# Patient Record
(demographics unavailable — no encounter records)

---

## 2024-12-20 NOTE — HISTORY OF PRESENT ILLNESS
[FreeTextEntry1] : Ms. REYES is a 42 year female presenting for evaluation of CAD and HF.    Notable PMHx: - HFrEF 2/2 ICM - CAD s/p NSTEMI - Obesity s/p gastric sleeve  - Prediabetes  - LUIS - no family history of MI or SCD   HPI: Here for follow up after recent admission to [facility] for chest pain that was right sided and occuring at rest and later after meals. Described as pressure like. She had a runs of NSVT and then later PMVT arrest the day of admission requiring two rounds of CPR and 1 round of epi. She was taken emergently to the cath lab where she was found to have an acute OM plaque rupture and distal embolization which was stented with a good angioraphic result. She was started on GDMT with losartan and toprol, statin, and DAPT with brilinta. She has been feeling okay since discharge struggling with when a pain is significant or not. She also has been having some light-headedness and heavy menses.    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 Lateral TWI - 12/8/24: Sinus tach. inferolateral ST elevations. Echo - 12/10/24: LVEF 28%. - 12/8/2024 TTE: LVEF 20%. The entire apex, mid and apical anterior wall, mid and apical anterior septum, mid and apical inferior septum, mid and apical inferior wall, mid inferolateral segment, and mid anterolateral segment are akinetic. The basal anteroseptal segment, basal inferoseptal segment, basal inferolateral segment, basal anterolateral segment, basal anterior segment, and basal inferior segment are hypokinetic. Mil AI, mild to mod TR, PASP 48 Stress - 3/2016 EKG stress: normal. 10 mets, exercised 8.5 minutes.  Cardiac MRI - 12/11/24: LVEF 45%, wall motion abnormality of mid to apical and mid inferolateral/inferior walls and apical lateral and inferior wall with mild myocardial edema and subendocardial delayed enhancement. Normal RV. LGE < 50% thickness of wall.  Cath - 12/8/24: 1. Severe distal OM1 stenosis s/p successful balloon angioplasty and AMERICA x1. This is the culprit for the patient's presentation. IVUS shows atherosclerotic lesion. Patient had resolution of chest pain after PCI. 2. Severe LV dysfunction that is out of proportion to CAD. LVEDP was 31mmHg at the start of the case and patient was complaining of acute shortness of breath. She was placed on Bipap and given 20mg IV Lasix with a good urine output. Following PCI, a RHC was performed: RA 5, RV 35/7, PA 26/15/19, PCWP 16/17/13. Brennan CO 4.81, CI 2.59. Other -

## 2024-12-20 NOTE — HISTORY OF PRESENT ILLNESS
[FreeTextEntry1] : Ms. REYES is a 42 year female presenting for evaluation of CAD and HF.    Notable PMHx: - HFrEF 2/2 ICM - CAD s/p NSTEMI - Obesity s/p gastric sleeve  - Prediabetes  - LUIS - no family history of MI or SCD   HPI: Here for follow up after recent admission to [facility] for chest pain that was right sided and occuring at rest and later after meals. Described as pressure like. She had a runs of NSVT and then later PMVT arrest the day of admission requiring two rounds of CPR and 1 round of epi. She was taken emergently to the cath lab where she was found to have an acute OM plaque rupture and distal embolization which was stented with a good angioraphic result. She was started on GDMT with losartan and toprol, statin, and DAPT with brilinta. She has been feeling okay since discharge struggling with when a pain is significant or not. She also has been having some light-headedness and heavy menses.    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 Lateral TWI - 12/8/24: Sinus tach. inferolateral ST elevations. Echo - 12/10/24: LVEF 28%. - 12/8/2024 TTE: LVEF 20%. The entire apex, mid and apical anterior wall, mid and apical anterior septum, mid and apical inferior septum, mid and apical inferior wall, mid inferolateral segment, and mid anterolateral segment are akinetic. The basal anteroseptal segment, basal inferoseptal segment, basal inferolateral segment, basal anterolateral segment, basal anterior segment, and basal inferior segment are hypokinetic. Mil AI, mild to mod TR, PASP 48 Stress - 3/2016 EKG stress: normal. 10 mets, exercised 8.5 minutes.  Cardiac MRI - 12/11/24: LVEF 45%, wall motion abnormality of mid to apical and mid inferolateral/inferior walls and apical lateral and inferior wall with mild myocardial edema and subendocardial delayed enhancement. Normal RV. LGE < 50% thickness of wall.  Cath - 12/8/24: 1. Severe distal OM1 stenosis s/p successful balloon angioplasty and AMERICA x1. This is the culprit for the patient's presentation. IVUS shows atherosclerotic lesion. Patient had resolution of chest pain after PCI. 2. Severe LV dysfunction that is out of proportion to CAD. LVEDP was 31mmHg at the start of the case and patient was complaining of acute shortness of breath. She was placed on Bipap and given 20mg IV Lasix with a good urine output. Following PCI, a RHC was performed: RA 5, RV 35/7, PA 26/15/19, PCWP 16/17/13. Brennan CO 4.81, CI 2.59. Other -

## 2024-12-20 NOTE — HISTORY OF PRESENT ILLNESS
[FreeTextEntry1] : Ms. REYES is a 42 year female presenting for evaluation of CAD and HF.    Notable PMHx: - HFrEF 2/2 ICM - CAD s/p NSTEMI - Obesity s/p gastric sleeve  - Prediabetes  - LUIS - no family history of MI or SCD   HPI: Here for follow up after recent admission to [facility] for chest pain that was right sided and occuring at rest and later after meals. Described as pressure like. She had a runs of NSVT and then later PMVT arrest the day of admission requiring two rounds of CPR and 1 round of epi. She was taken emergently to the cath lab where she was found to have an acute OM plaque rupture and distal embolization which was stented with a good angioraphic result. She was started on GDMT with losartan and toprol, statin, and DAPT with brilinta. She has been feeling okay since discharge struggling with when a pain is significant or not. She also has been having some light-headedness and heavy menses.    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 Lateral TWI - 12/8/24: Sinus tach. inferolateral ST elevations. Echo - 12/10/24: LVEF 28%. - 12/8/2024 TTE: LVEF 20%. The entire apex, mid and apical anterior wall, mid and apical anterior septum, mid and apical inferior septum, mid and apical inferior wall, mid inferolateral segment, and mid anterolateral segment are akinetic. The basal anteroseptal segment, basal inferoseptal segment, basal inferolateral segment, basal anterolateral segment, basal anterior segment, and basal inferior segment are hypokinetic. Mil AI, mild to mod TR, PASP 48 Stress - 3/2016 EKG stress: normal. 10 mets, exercised 8.5 minutes.  Cardiac MRI - 12/11/24: LVEF 45%, wall motion abnormality of mid to apical and mid inferolateral/inferior walls and apical lateral and inferior wall with mild myocardial edema and subendocardial delayed enhancement. Normal RV. LGE < 50% thickness of wall.  Cath - 12/8/24: 1. Severe distal OM1 stenosis s/p successful balloon angioplasty and AMERICA x1. This is the culprit for the patient's presentation. IVUS shows atherosclerotic lesion. Patient had resolution of chest pain after PCI. 2. Severe LV dysfunction that is out of proportion to CAD. LVEDP was 31mmHg at the start of the case and patient was complaining of acute shortness of breath. She was placed on Bipap and given 20mg IV Lasix with a good urine output. Following PCI, a RHC was performed: RA 5, RV 35/7, PA 26/15/19, PCWP 16/17/13. Brennan CO 4.81, CI 2.59. Other -

## 2024-12-20 NOTE — DISCUSSION/SUMMARY
[EKG obtained to assist in diagnosis and management of assessed problem(s)] : EKG obtained to assist in diagnosis and management of assessed problem(s) [FreeTextEntry1] : Here for follow up after recent admission for ACS complicated by TdP arrest requiring CPR and epi and ICM with reduced LVEF. Her CMR showed improving LVEF compared to the prior echos making myocardial stunning much more likely. Today she has borderline low BP and light-headedness. Will check CBC to ensure no significant anemia given her heavy menses while on DAPT and encouraged her to switch losartan and toprol to nighttime dosing. Will avoid any further GDMT titration for now.   # Light-headedness # Heavy Menses - check CBC and iron studies  - she started iron supplementation per her PCP  # HFrEF 2/2 ICM - c/w losartan 25mg qd - c/w toprol 50mg qd  - hold off on cardiac rehab till light-headedness/BP issues are resolved    # CAD s/p PCI - c/w DAPT with brilinta for at least 1 year  - c/w atorvastatin 80mg qd - BB as above  RTC in 2 weeks for telehealth visit or sooner PRN

## 2024-12-20 NOTE — END OF VISIT
[Time Spent: ___ minutes] : I have spent [unfilled] minutes of time on the encounter which excludes teaching and separately reported services. throat

## 2024-12-22 NOTE — HISTORY OF PRESENT ILLNESS
[Post-hospitalization from ___ Hospital] : Post-hospitalization from [unfilled] Hospital [Admitted on: ___] : The patient was admitted on [unfilled] [Discharged on ___] : discharged on [unfilled] [Patient Contacted By: ____] : and contacted by [unfilled] [FreeTextEntry2] : 42-year-old female with history of obesity, s/p gastric sleeve on GLP, prediabetes and LUIS who presented to [facility] hospital on December 17, 2024 with chest pain on the right side that started around 2 PM and lasted for 20 minutes while at rest and resolved spontaneously.  Chest pain occurred again 2 hours later around 4 PM after eating a meal but then persisted and prompted ED presentation.  Patient described the chest pain as pressure and did not feel similar to her GERD symptoms, nor is it related to position.  Patient denies taking any new medication. Patient taking PPI and tirzepatide. Stress test and TTE from 2016 were normal. As per EMS note, no improvement with fentanyl or nitro sublingual in ER. Hospital course: Patient was admitted with NSTEMI, presented with midsternal chest pain and concern for ACS.  Started on heparin GTT, atorvastatin and trended cardiac enzymes that peaked at 614.  Patient was CODE BLUE, required active CPR achieving ROSC after 2 rounds and one-time epinephrine then taken to urgent cardiac catheterization and CICU admission.  On December 8 s/p 1 AMERICA to OM.  LHC: % RHC: Elevated filling pressures. TTE showed severely decreased LVEF 20 to 25%.  Patient was transition to DAPT, metoprolol and restarted on losartan. Repeat TTE showed continued decrease LVEF, MRI on December 11 showed ischemic cardiomyopathy and EF 45% improved compared to TTE. She was advised to follow-up with as an outpatient with cardiology. MRI also incidentally showed liver lesions and will require a dedicated MRI of the abdomen which can be performed as an outpatient.  Patient is currently stable to discharge to home. Active or pending issues requiring follow-up:  PCP follow-cardiology follow-up with Dr. Nam or Dr. Tha Bellamy [phone] Dr. Tha Bellamy for repeat TTE to assess LV function up Last travel history was 1 month ago to [place] in [place] on a cruise to [facility]. Family history notable for rheumatic heart disease in mother who had rheumatic fever and PPM.  There is no history of MI in the family.

## 2024-12-22 NOTE — HISTORY OF PRESENT ILLNESS
[Post-hospitalization from ___ Hospital] : Post-hospitalization from [unfilled] Hospital [Admitted on: ___] : The patient was admitted on [unfilled] [Discharged on ___] : discharged on [unfilled] [Patient Contacted By: ____] : and contacted by [unfilled] [FreeTextEntry2] : 42-year-old female with history of obesity, s/p gastric sleeve on GLP, prediabetes and LUIS who presented to [facility] hospital on December 17, 2024 with chest pain on the right side that started around 2 PM and lasted for 20 minutes while at rest and resolved spontaneously.  Chest pain occurred again 2 hours later around 4 PM after eating a meal but then persisted and prompted ED presentation.  Patient described the chest pain as pressure and did not feel similar to her GERD symptoms, nor is it related to position.  Patient denies taking any new medication. Patient taking PPI and tirzepatide. Stress test and TTE from 2016 were normal. As per EMS note, no improvement with fentanyl or nitro sublingual in ER. Hospital course: Patient was admitted with NSTEMI, presented with midsternal chest pain and concern for ACS.  Started on heparin GTT, atorvastatin and trended cardiac enzymes that peaked at 614.  Patient was CODE BLUE, required active CPR achieving ROSC after 2 rounds and one-time epinephrine then taken to urgent cardiac catheterization and CICU admission.  On December 8 s/p 1 AMERICA to OM.  LHC: % RHC: Elevated filling pressures. TTE showed severely decreased LVEF 20 to 25%.  Patient was transition to DAPT, metoprolol and restarted on losartan. Repeat TTE showed continued decrease LVEF, MRI on December 11 showed ischemic cardiomyopathy and EF 45% improved compared to TTE. She was advised to follow-up with as an outpatient with cardiology. MRI also incidentally showed liver lesions and will require a dedicated MRI of the abdomen which can be performed as an outpatient.  Patient is currently stable to discharge to home. Active or pending issues requiring follow-up:  PCP follow-cardiology follow-up with Dr. Nam or Dr. Tha Bellamy [phone] Dr. Tha Bellamy for repeat TTE to assess LV function up Last travel history was 1 month ago to [place] in [place] on a cruise to [facility]. Family history notable for rheumatic heart disease in mother who had rheumatic fever and PPM.  There is no history of MI in the family.

## 2025-01-07 NOTE — PLAN
[FreeTextEntry1] : Will get sonogram and then proceed with Mirena IUD insertion Will do endobiopsy at that time IUD discussed. Risks/benefits and alternatives including infection, hospitalization and removal. Irregular bleeding discussed. patient understands and would like to proceed

## 2025-01-07 NOTE — HISTORY OF PRESENT ILLNESS
[FreeTextEntry1] : Patient had an MI in 12/24 She is on blood thinners currently and has had very long and heavy periods She is currently bleeding

## 2025-01-08 NOTE — HISTORY OF PRESENT ILLNESS
[FreeTextEntry1] : Ms. REYES is a 42 year female presenting for evaluation of CAD and HF.    Notable PMHx: - HFimpEF 2/2 ICM (Stage C, Class II) - CAD --- 12/2024: NSTEMI c/b VT arrest s/p AMERICA x 1 to OM1 - Obesity s/p gastric sleeve  - Prediabetes  - LUIS - Iron Deficiency Anemia 2/2 Heavy Menses - no family history of MI or SCD   HPI: Since last visit, labs resulted showing iron deficiency anemia, normal renal function, normal potassium and sodium, LDL of 25 and HDL of 37, and proBNP 317. Went to [place] ED for back/chest pain that in hindsight was likely as a result of sleeping in recliner since  was sick. Workup included normal BNP, troponin, CRP. Negative CTPE study. EKG unchanged. Hgb 10. Her light-headedness has resolved since switching to nightime dosing of her HF meds. She has been monitoring her BP at home which has been in the 110s/60-70s. Her vaginal bleeding had stopped last month and then restarted. She saw gyn who is planning for pelvic US and IUD in 2 weeks. She has been walking the stores of [employer] and [employer] without limitations or symptoms.   12/20/24 Here for follow up after recent admission to [facility] for chest pain that was right sided and occuring at rest and later after meals. Described as pressure like. She had a runs of NSVT and then later PMVT arrest the day of admission requiring two rounds of CPR and 1 round of epi. She was taken emergently to the cath lab where she was found to have an acute OM plaque rupture and distal embolization which was stented with a good angiographic result. She was started on GDMT with losartan and toprol, statin, and DAPT with brilinta. She has been feeling okay since discharge struggling with when a pain is significant or not. She also has been having some light-headedness and heavy menses.    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 Lateral TWI - 12/8/24: Sinus tach. inferolateral ST elevations. Echo - 1/2025: LVEF 55%, normal diastology, normal RV, trace MR, normal PASP,  - 12/10/24: LVEF 28% - 12/8/2024 TTE: LVEF 20%. The entire apex, mid and apical anterior wall, mid and apical anterior septum, mid and apical inferior septum, mid and apical inferior wall, mid inferolateral segment, and mid anterolateral segment are akinetic. The basal anteroseptal segment, basal inferoseptal segment, basal inferolateral segment, basal anterolateral segment, basal anterior segment, and basal inferior segment are hypokinetic. Mil AI, mild to mod TR, PASP 48 Stress - 3/2016 EKG stress: normal. 10 mets, exercised 8.5 minutes.  Cardiac MRI - 12/11/24: LVEF 45%, wall motion abnormality of mid to apical and mid inferolateral/inferior walls and apical lateral and inferior wall with mild myocardial edema and subendocardial delayed enhancement. Normal RV. LGE < 50% thickness of wall.  Cath - 12/8/24: 1. Severe distal OM1 stenosis s/p successful balloon angioplasty and AMERICA x1. This is the culprit for the patient's presentation. IVUS shows atherosclerotic lesion. Patient had resolution of chest pain after PCI. 2. Severe LV dysfunction that is out of proportion to CAD. LVEDP was 31mmHg at the start of the case and patient was complaining of acute shortness of breath. She was placed on Bipap and given 20mg IV Lasix with a good urine output. Following PCI, a RHC was performed: RA 5, RV 35/7, PA 26/15/19, PCWP 16/17/13. Brennan CO 4.81, CI 2.59. Other -

## 2025-01-08 NOTE — REASON FOR VISIT
[Home] : at home, [unfilled] , at the time of the visit. [Medical Office: ([facility])___] : at the medical office located in  [Patient] : the patient [Self] : self

## 2025-01-08 NOTE — REASON FOR VISIT
[Home] : at home, [unfilled] , at the time of the visit. [Medical Office: ([facility])___] : at the medical office located in  [Patient] : the patient [Self] : self

## 2025-01-08 NOTE — HISTORY OF PRESENT ILLNESS
[FreeTextEntry1] : Ms. REYES is a 42 year female presenting for evaluation of CAD and HF.    Notable PMHx: - HFimpEF 2/2 ICM (Stage C, Class II) - CAD --- 12/2024: NSTEMI c/b VT arrest s/p AMERICA x 1 to OM1 - Obesity s/p gastric sleeve  - Prediabetes  - LUIS - Iron Deficiency Anemia 2/2 Heavy Menses - no family history of MI or SCD   HPI: Since last visit, labs resulted showing iron deficiency anemia, normal renal function, normal potassium and sodium, LDL of 25 and HDL of 37, and proBNP 317. Went to [place] ED for back/chest pain that in hindsight was likely as a result of sleeping in recliner since  was sick. Workup included normal BNP, troponin, CRP. Negative CTPE study. EKG unchanged. Hgb 10. Her light-headedness has resolved since switching to nightime dosing of her HF meds. She has been monitoring her BP at home which has been in the 110s/60-70s. Her vaginal bleeding had stopped last month and then restarted. She saw gyn who is planning for pelvic US and IUD in 2 weeks. She has been walking the stores of [employer] and [employer] without limitations or symptoms.   12/20/24 Here for follow up after recent admission to [facility] for chest pain that was right sided and occuring at rest and later after meals. Described as pressure like. She had a runs of NSVT and then later PMVT arrest the day of admission requiring two rounds of CPR and 1 round of epi. She was taken emergently to the cath lab where she was found to have an acute OM plaque rupture and distal embolization which was stented with a good angiographic result. She was started on GDMT with losartan and toprol, statin, and DAPT with brilinta. She has been feeling okay since discharge struggling with when a pain is significant or not. She also has been having some light-headedness and heavy menses.    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 Lateral TWI - 12/8/24: Sinus tach. inferolateral ST elevations. Echo - 1/2025: LVEF 55%, normal diastology, normal RV, trace MR, normal PASP,  - 12/10/24: LVEF 28% - 12/8/2024 TTE: LVEF 20%. The entire apex, mid and apical anterior wall, mid and apical anterior septum, mid and apical inferior septum, mid and apical inferior wall, mid inferolateral segment, and mid anterolateral segment are akinetic. The basal anteroseptal segment, basal inferoseptal segment, basal inferolateral segment, basal anterolateral segment, basal anterior segment, and basal inferior segment are hypokinetic. Mil AI, mild to mod TR, PASP 48 Stress - 3/2016 EKG stress: normal. 10 mets, exercised 8.5 minutes.  Cardiac MRI - 12/11/24: LVEF 45%, wall motion abnormality of mid to apical and mid inferolateral/inferior walls and apical lateral and inferior wall with mild myocardial edema and subendocardial delayed enhancement. Normal RV. LGE < 50% thickness of wall.  Cath - 12/8/24: 1. Severe distal OM1 stenosis s/p successful balloon angioplasty and AMERICA x1. This is the culprit for the patient's presentation. IVUS shows atherosclerotic lesion. Patient had resolution of chest pain after PCI. 2. Severe LV dysfunction that is out of proportion to CAD. LVEDP was 31mmHg at the start of the case and patient was complaining of acute shortness of breath. She was placed on Bipap and given 20mg IV Lasix with a good urine output. Following PCI, a RHC was performed: RA 5, RV 35/7, PA 26/15/19, PCWP 16/17/13. Brennan CO 4.81, CI 2.59. Other -

## 2025-01-08 NOTE — DISCUSSION/SUMMARY
[FreeTextEntry1] : Here for follow up for CAD and HF. Recent ED visit for chest pain with negative workup and TTE that showed recovered LVEF. Light-headedness resolved and BP improved. Discussed starting jardiance, referral to heme for consideration of IV iron, and referral to virtual cardiac rehab. She will keep a BP and HR log at home for follow-up.  # HFimpEF 2/2 ICM - c/w losartan 25mg qhs - c/w toprol 50mg qhs - start jardiance 10mg qd - check BMP in 2 weeks  # CAD s/p PCI # HLD - c/w DAPT with brilinta for at least 1 year (end date: 12/2025) - c/w atorvastatin 80mg qd - BB as above - referral to cardiac rehab  # Light-headedness, resolved # Heavy Menses # Iron Deficiency Anemia - recommended she see hematology for consideration of IV iron in the setting of ongoing vaginal bleeding, HF, and recent CAD s/p PCI on DAPT  RTC in 1 month for telehealth visit or sooner PRN.

## 2025-01-10 NOTE — REASON FOR VISIT
[Home] : at home, [unfilled] , at the time of the visit. [Other Location: e.g. Home (Enter Location, City,State)___] : at [unfilled] [Patient] : the patient [Initial Consultation] : an initial consultation for [FreeTextEntry2] : Anemia

## 2025-01-10 NOTE — HISTORY OF PRESENT ILLNESS
[de-identified] : cc :  Anemia   43 Year old Female with Pmhx  recent CAD s/p MI s/p PCI 12/8/2024, heart failure, gastric bypass presents for an evaluation of anemia.  States that her whole life she has been experiencing heavy menses and has seen a heme MD back in 2021 she was found with Hgb5.4 and did not have a blood transfusion but had IV iron infusions.  SInce then she has been ok with normal hgb until recently.  Most recent labs on 12/2024 showed Hgb 9.9 WBC 13.9 HCT 34.0 MCV 81.7 PLT 449K.   She feels tired and fatigued.  She has not returned to work as she is still in cardiac rehab and slowly getting back stamina. She   reports to have heavy menses prior MI having menses every 28-30 days that last 6-7 days and heaviest day being on day 2-3. changes pads every 2-3 hours,  now that she is on Antiplatelet therapy has gotten more pronounced heavy menses.  Denies excessive or spontaneous bleeding or bruising. Denies  enlarged nodes. Denies  dysuria,  nocturia,  hesitancy,  urgency, oliguria,  hematuria, incontinence. Denies  nausea,  vomiting,  diarrhea,  Constipation,  heartburn, abdominal pain,  jaundice, melena, blood in stool. Denies  palpitations,  dyspnea, orthopnea, PND, Denies claudication,  edema,  varicose veins, Denies Fever, rigors,  diaphoresis.  Denies anorexia,  weight loss, sleep disturbance.  Denies resting dyspnea, exertional dyspnea, wheezing, cough,  stridor,  hemoptysis, Denies rash,  pruritus,  skin lesions, Denies bone pain,  Myalgia,  arthralgia,   joint swelling,  limited range of motion, Patient does not complain of frequent or severe infections. Patient does not complain of any allergic reactions.   PMhx : as above obesity Psx : PCI  12/8/24 [facility],  Gastric Sleeve 2016.  nasal sx in 2008 NKDA  Meds : see list reconciled Social Hx:  with no children,  works as a supervisor at [employer], Social ETOH,  denies Smoking TOB and illicit drugs. [place],  father from [place] mother - American Fam hx: mother HTN. high chol, rhematic fevers- MVP,  Father DMII , HTN  Health Maintenance : PCP Dr Carlos Dukeshill/sonja 2024 all ok Pap smear : all ok

## 2025-01-10 NOTE — ASSESSMENT
[FreeTextEntry1] : 43 Year old Female with Pmhx  recent CAD s/p MI s/p PCI 12/8/2024, heart failure, gastric bypass presents for an evaluation of anemia.  States that her whole life she has been experiencing heavy menses and has seen a heme MD back in 2021 she was found with Hgb5.4 and did not have a blood transfusion but had IV iron infusions.  SInce then she has been ok with normal hgb until recently.  Most recent labs on 12/2024 showed Hgb 9.9 WBC 13.9 HCT 34.0 MCV 81.7 PLT 449K.   She feels tired and fatigued.     Problem # 1 Anemia of iron deficiency - CBC, CMP for baseline - nutritional deficiency screen: B12/folate, - bleeding screen: iron studies - hemolytic screen- LDH/hapto.retic - chronic disease markers- ESR/CRP -Given Hx, age, parity and gender, labs, all consistent with Iron deficiency anemia. Feeling tired, but no cardiorespiratory distress, PO iron may not be sufficient to counterbalance the heavy loss of iron that she experiences through the heavy menstrual and ocassional intermenstrual bleeding. We'll begin IV iron once i get back CBC and work up. We'll F/U in 1 week after lab draws.

## 2025-01-21 NOTE — PLAN
[FreeTextEntry1] : Cardiac Rehabilitation Phase 2 - Virtual sessions Focus assessment and physical exam prior to in-person session #1 by Dr. Liang ITP initiated- confer with Dr. Liang  All questions answered.  Appointment information emailed to patient, obtained permission. Session #1 date: 1/28/25 at 1:30 PM SRN with Dr. Liang will be on 1/28/25 at 1:00 PM Patient will use home peloton and will enroll in 4:30 PM VCR class after one on one session.

## 2025-01-21 NOTE — REVIEW OF SYSTEMS
[Recent Change In Weight] : ~T no recent weight change [Vision Problems] : no vision problems [Hearing Loss] : no hearing loss [Chest Pain] : no chest pain [Palpitations] : palpitations [Leg Claudication] : no leg claudication [Lower Ext Edema] : no lower extremity edema [Shortness Of Breath] : no shortness of breath [Wheezing] : no wheezing [Dyspnea on Exertion] : no dyspnea on exertion [Abdominal Pain] : no abdominal pain [Joint Pain] : no joint pain [Muscle Pain] : no muscle pain [Headache] : no headache [Dizziness] : no dizziness [Memory Loss] : no memory loss [Unsteady Walking] : no ataxia [Anxiety] : no anxiety [Depression] : no depression [FreeTextEntry5] : see hpi

## 2025-01-21 NOTE — HISTORY OF PRESENT ILLNESS
[Home] : at home, [unfilled] , at the time of the visit. [Medical Office: ([facility])___] : at the medical office located in  [Verbal consent obtained from patient] : the patient, [unfilled] [FreeTextEntry1] : Ms. Khloe Teixeira is a 43 year old female s/p NSTEMI with I AMERICA to OM1. She presents today via telephone for virtual cardiac rehabilitation initial intake assessment. She reports feeling well and denies focal complaints at time of phone call. Ms. Teixeira was in her usual state of health until This past December when she presented to [facility] with chest discomfort and associated n/v. She made troponins and was admitted to telemetry for NSTEMI. Found to go into VT/ Torsades with cardiac arrest the following morning on the floor, 1 epi and shocked @120J x1, ROSC achieved and patient brought to Cath Lab with Dr. Tyler for emergent LHC. I AMERICA was placed to OM, patient spent 1 day in CCU and was downgraded to telemetry. She was able to be discharged home several days later. She reports some intermittent episodes of palpitations, currently followed by Dr. HANY Bellamy and has a holter in place for monitoring. She had pertinent history of gastric sleeve sx in 2016 (significant weight loss around 90 lbs), no other pertinent pmhx and denies significant family hx. She is independent with ADLS, works for the health system. She is eager to begin cardiac rehabilitation and has embraced a heart healthy lifestyle.  [Fully functional (bathing, dressing, toileting, transferring, walking, feeding)] : Fully functional (bathing, dressing, toileting, transferring, walking, feeding)

## 2025-01-22 NOTE — PROCEDURE
[Endometrial Biopsy] : Endometrial biopsy [Allergic Reaction] : allergic reaction [Negative] : negative pregnancy test [___ mL Injected] : [unfilled] ~UmL of lidocaine [0.01] : 0.01 [Anteverted] : anteverted [Scant] : scant [Specimen Collected] : collected [Sent to Pathology] : placed in buffered formalin and sent for pathology [IUD Placement] : intrauterine device (IUD) placement [Time out performed] : Pre-procedure time out performed.  Patient's name, date of birth and procedure confirmed. [Consent Obtained] : Consent obtained [Risks] : risks [Benefits] : benefits [Alternatives] : alternatives [Patient] : patient [Infection] : infection [Bleeding] : bleeding [Pain] : pain [Expulsion] : expulsion [Failure] : failure [Uterine Perforation] : uterine perforation [Neg Pregnancy Test] : negative pregnancy test [Betadine] : Betadine [Tenaculum] : Tenaculum [Easy Passage] : Easy passage [Post Placement Transvag. US] : post placement transvaginal ultrasound [Mirena IUD] : Mirena IUD [Tolerated Well] : Patient tolerated the procedure well [No Complications] : No complications

## 2025-01-24 NOTE — REASON FOR VISIT
[Home] : at home, [unfilled] , at the time of the visit. [Other Location: e.g. Home (Enter Location, City,State)___] : at [unfilled] [Patient] : the patient [Follow-Up Visit] : a follow-up visit for [FreeTextEntry2] : anemia

## 2025-01-24 NOTE — HISTORY OF PRESENT ILLNESS
[de-identified] : cc : Anemia  43 Year old Female with Pmhx recent CAD s/p MI s/p PCI 12/8/2024, heart failure, gastric bypass presents for an evaluation of anemia. States that her whole life she has been experiencing heavy menses and has seen a heme MD back in 2021 she was found with Hgb5.4 and did not have a blood transfusion but had IV iron infusions. SInce then she has been ok with normal hgb until recently. Most recent labs on 12/2024 showed Hgb 9.9 WBC 13.9 HCT 34.0 MCV 81.7 PLT 449K. She feels tired and fatigued. She has not returned to work as she is still in cardiac rehab and slowly getting back stamina. She reports to have heavy menses prior MI having menses every 28-30 days that last 6-7 days and heaviest day being on day 2-3. changes pads every 2-3 hours, now that she is on Antiplatelet therapy has gotten more pronounced heavy menses.  Denies excessive or spontaneous bleeding or bruising. Denies enlarged nodes. Denies dysuria, nocturia, hesitancy, urgency, oliguria, hematuria, incontinence. Denies nausea, vomiting, diarrhea, Constipation, heartburn, abdominal pain, jaundice, melena, blood in stool. Denies palpitations, dyspnea, orthopnea, PND, Denies claudication, edema, varicose veins, Denies Fever, rigors, diaphoresis. Denies anorexia, weight loss, sleep disturbance. Denies resting dyspnea, exertional dyspnea, wheezing, cough, stridor, hemoptysis, Denies rash, pruritus, skin lesions, Denies bone pain, Myalgia, arthralgia, joint swelling, limited range of motion, Patient does not complain of frequent or severe infections. Patient does not complain of any allergic reactions.  PMhx : as above obesity Psx : PCI 12/8/24 [facility], Gastric Sleeve 2016. 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 [place], father from [place] mother - American Fam hx: mother HTN. high chol, rhematic fevers- MVP, Father DMII , HTN Health Maintenance : PCP Dr Carlos Dukeso/sono 2024 all ok Pap smear : all ok. [de-identified] : 1/24/25 Pt presented for f.u of recent anemia work up . however she was tested only for 2 tests ( ESR/CRP ) at [facility] location .  I have ordered a full anemia work up such as CBC with diff, CMP, total iron & TIBC, ferritin, vitamin B12 and folate levels, reticulocyte count,  EPO and Von willebrand's panel but not done.  Pt states feel tired no changes since last visit.

## 2025-01-24 NOTE — REVIEW OF SYSTEMS
[Fatigue] : fatigue [Diarrhea: Grade 0] : Diarrhea: Grade 0 [Negative] : Allergic/Immunologic [Chills] : no chills [Fever] : no fever [Night Sweats] : no night sweats [Recent Change In Weight] : ~T no recent weight change

## 2025-01-24 NOTE — ASSESSMENT
[FreeTextEntry1] : 43 Year old Female with Pmhx recent CAD s/p MI s/p PCI 12/8/2024, heart failure, gastric bypass presents for an evaluation of anemia. States that her whole life she has been experiencing heavy menses and has seen a heme MD back in 2021 she was found with Hgb5.4 and did not have a blood transfusion but had IV iron infusions. SInce then she has been ok with normal hgb until recently. Most recent labs on 12/2024 showed Hgb 9.9 WBC 13.9 HCT 34.0 MCV 81.7 PLT 449K. She feels tired and fatigued.  Problem # 1 Anemia of iron deficiency -REORDERED ALL LABS EXCEPT CMP- she is going today 1/24/25 - nutritional deficiency screen: B12/folate, - bleeding screen: iron studies - hemolytic screen- LDH/hapto.retic - chronic disease markers- ESR/CRP- mildly elevated due to recent MI -Given Hx, age, parity and gender, labs, all consistent with Iron deficiency anemia. Feeling tired, but no cardiorespiratory distress, PO iron may not be sufficient to counterbalance the heavy loss of iron that she experiences through the heavy menstrual and occasional intermenstrual bleeding and hx of gastric bypass, We'll begin IV iron ,will call her with todays results  Will start with Weekly or daily IV iron ( Venofer) or ferraheme,  will check iron studies after 6th cycle. Risks, benefits, side effects and administration of tx discussed. Patient verbalized understanding and opted for IV iron Pt consented via telehealth today. Will f/u post iv infusions around 4-5 weeks after

## 2025-01-28 NOTE — DISCUSSION/SUMMARY
[FreeTextEntry1] :  Cleared for Cardiac Rehab   No contraindications                                                                                                                                                                                                                                                                                                                                                                                                                                               30 minutes was provided for preventive counseling on healthy diet, weight maintenance, CV risk reduction. Specifically, and separate from other cardiovascular evaluation and treatment, we discussed PING 's willingness to focus  on lifestyle changes, particularly dietary; including greater consumption of vegetables, fruits over saturated fats. We discussed appropriate follow up to monitor progress on these areas. We also discussed the importance of weight control to reduce any exacerbation of underlying conditions.

## 2025-01-28 NOTE — HISTORY OF PRESENT ILLNESS
[FreeTextEntry1] : Initial Assessment for CR: S/p MI/ PCI 1/2025        ITP/H&P 43 year old female s/p NSTEMI with I AMERICA to OM1 he reports feeling well and denies focal complaints at time of phone call. Ms. Teixeira was in her usual state of health until This past December when she presented to [facility] with chest discomfort and associated n/v. She made troponins and was admitted to telemetry for NSTEMI. Found to go into VT/ Torsades with cardiac arrest the following morning on the floor, 1 epi and shocked @120J x1, ROSC achieved and patient brought to Cath Lab with Dr. Tyler for emergent LHC. I AMREICA was placed to OM, patient spent 1 day in CCU and was downgraded to telemetry. She was able to be discharged home several days later. She reports some intermittent episodes of palpitations, currently followed by Dr. HANY Bellamy

## 2025-02-05 NOTE — HISTORY OF PRESENT ILLNESS
[FreeTextEntry1] : Ms. REYES is a 42 year female presenting for evaluation of CAD and HF.    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 Started virtual CR. Holter resulted showing sinus rhythm with 3 symptom triggers correlating with rare isolated PVCs and sinus rhythm, average HR of 64bpm. Since taking holter off she reports maybe one episode of the fluttering but otherwise has been feeling well. She has been working on her diet utilizing [employer] and healthy meals direct and has lost 6 lbs since discharge from the hospital in 12/2025.   1/8/25 labs resulted showing iron deficiency anemia, normal renal function, normal potassium and sodium, LDL of 25 and HDL of 37, and proBNP 317. Went to [place] ED for back/chest pain that in hindsight was likely as a result of sleeping in recliner since  was sick. Workup included normal BNP, troponin, CRP. Negative CTPE study. EKG unchanged. Hgb 10. Her light-headedness has resolved since switching to nightime dosing of her HF meds. She has been monitoring her BP at home which has been in the 110s/60-70s. Her vaginal bleeding had stopped last month and then restarted. She saw gyn who is planning for pelvic US and IUD in 2 weeks. She has been walking the stores of [employer] without limitations or symptoms.   12/20/24 Here for follow up after recent admission to [facility] for chest pain that was right sided and occuring at rest and later after meals. Described as pressure like. She had a runs of NSVT and then later PMVT arrest the day of admission requiring two rounds of CPR and 1 round of epi. She was taken emergently to the cath lab where she was found to have an acute OM plaque rupture and distal embolization which was stented with a good angiographic result. She was started on GDMT with losartan and toprol, statin, and DAPT with brilinta. She has been feeling okay since discharge struggling with when a pain is significant or not. She also has been having some light-headedness and heavy menses.    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 Lateral TWI - 12/8/24: Sinus tach. inferolateral ST elevations. Echo - 1/4/2025: LVEF 55%, normal diastology, normal RV, trace MR, normal PASP,  - 12/10/24: LVEF 28% - 12/8/2024 TTE: LVEF 20%. The entire apex, mid and apical anterior wall, mid and apical anterior septum, mid and apical inferior septum, mid and apical inferior wall, mid inferolateral segment, and mid anterolateral segment are akinetic. The basal anteroseptal segment, basal inferoseptal segment, basal inferolateral segment, basal anterolateral segment, basal anterior segment, and basal inferior segment are hypokinetic. Mil AI, mild to mod TR, PASP 48 Stress - 3/2016 EKG stress: normal. 10 mets, exercised 8.5 minutes.  Cardiac MRI - 12/11/24: LVEF 45%, wall motion abnormality of mid to apical and mid inferolateral/inferior walls and apical lateral and inferior wall with mild myocardial edema and subendocardial delayed enhancement. Normal RV. LGE < 50% thickness of wall.  Cath - 12/8/24: 1. Severe distal OM1 stenosis s/p successful balloon angioplasty and AMERICA x1. This is the culprit for the patient's presentation. IVUS shows atherosclerotic lesion. Patient had resolution of chest pain after PCI. 2. Severe LV dysfunction that is out of proportion to CAD. LVEDP was 31mmHg at the start of the case and patient was complaining of acute shortness of breath. She was placed on Bipap and given 20mg IV Lasix with a good urine output. Following PCI, a RHC was performed: RA 5, RV 35/7, PA 26/15/19, PCWP 16/17/13. Brennan CO 4.81, CI 2.59. Holter - 1/2025: ~7days, 3 triggers correlating with rare isolated PVCs and sr. PVC burden <1%. Other -

## 2025-02-05 NOTE — DISCUSSION/SUMMARY
[FreeTextEntry1] : Here for follow up for CAD and HF. She is doing well participating in cardiac rehab and working on her diet. We discussed that her palpitations are from benign rare PVCs. Given that they are not very distressing and are infrequent, held off on increasing metoprolol dose. Will refer to nutrition for continued work on lifestyle modifications.   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

## 2025-02-05 NOTE — HISTORY OF PRESENT ILLNESS
[FreeTextEntry1] : Ms. REYES is a 42 year female presenting for evaluation of CAD and HF.    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 Started virtual CR. Holter resulted showing sinus rhythm with 3 symptom triggers correlating with rare isolated PVCs and sinus rhythm, average HR of 64bpm. Since taking holter off she reports maybe one episode of the fluttering but otherwise has been feeling well. She has been working on her diet utilizing [employer] and healthy meals direct and has lost 6 lbs since discharge from the hospital in 12/2025.   1/8/25 labs resulted showing iron deficiency anemia, normal renal function, normal potassium and sodium, LDL of 25 and HDL of 37, and proBNP 317. Went to [place] ED for back/chest pain that in hindsight was likely as a result of sleeping in recliner since  was sick. Workup included normal BNP, troponin, CRP. Negative CTPE study. EKG unchanged. Hgb 10. Her light-headedness has resolved since switching to nightime dosing of her HF meds. She has been monitoring her BP at home which has been in the 110s/60-70s. Her vaginal bleeding had stopped last month and then restarted. She saw gyn who is planning for pelvic US and IUD in 2 weeks. She has been walking the stores of [employer] without limitations or symptoms.   12/20/24 Here for follow up after recent admission to [facility] for chest pain that was right sided and occuring at rest and later after meals. Described as pressure like. She had a runs of NSVT and then later PMVT arrest the day of admission requiring two rounds of CPR and 1 round of epi. She was taken emergently to the cath lab where she was found to have an acute OM plaque rupture and distal embolization which was stented with a good angiographic result. She was started on GDMT with losartan and toprol, statin, and DAPT with brilinta. She has been feeling okay since discharge struggling with when a pain is significant or not. She also has been having some light-headedness and heavy menses.    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 Lateral TWI - 12/8/24: Sinus tach. inferolateral ST elevations. Echo - 1/4/2025: LVEF 55%, normal diastology, normal RV, trace MR, normal PASP,  - 12/10/24: LVEF 28% - 12/8/2024 TTE: LVEF 20%. The entire apex, mid and apical anterior wall, mid and apical anterior septum, mid and apical inferior septum, mid and apical inferior wall, mid inferolateral segment, and mid anterolateral segment are akinetic. The basal anteroseptal segment, basal inferoseptal segment, basal inferolateral segment, basal anterolateral segment, basal anterior segment, and basal inferior segment are hypokinetic. Mil AI, mild to mod TR, PASP 48 Stress - 3/2016 EKG stress: normal. 10 mets, exercised 8.5 minutes.  Cardiac MRI - 12/11/24: LVEF 45%, wall motion abnormality of mid to apical and mid inferolateral/inferior walls and apical lateral and inferior wall with mild myocardial edema and subendocardial delayed enhancement. Normal RV. LGE < 50% thickness of wall.  Cath - 12/8/24: 1. Severe distal OM1 stenosis s/p successful balloon angioplasty and AMERICA x1. This is the culprit for the patient's presentation. IVUS shows atherosclerotic lesion. Patient had resolution of chest pain after PCI. 2. Severe LV dysfunction that is out of proportion to CAD. LVEDP was 31mmHg at the start of the case and patient was complaining of acute shortness of breath. She was placed on Bipap and given 20mg IV Lasix with a good urine output. Following PCI, a RHC was performed: RA 5, RV 35/7, PA 26/15/19, PCWP 16/17/13. Brennan CO 4.81, CI 2.59. Holter - 1/2025: ~7days, 3 triggers correlating with rare isolated PVCs and sr. PVC burden <1%. Other -

## 2025-02-05 NOTE — REASON FOR VISIT
[Home] : at home, [unfilled] , at the time of the visit. [Medical Office: ([facility])___] : at the medical office located in  [Patient] : the patient [Self] : self

## 2025-02-28 NOTE — REASON FOR VISIT
[Initial Evaluation] : an initial evaluation [Home] : at home, [unfilled] , at the time of the visit. [Medical Office: ([facility])___] : at the medical office located in  [Telehealth (audio & video)] : This visit was provided via telehealth using real-time 2-way audio visual technology. [Verbal consent obtained from patient] : the patient, [unfilled] [FreeTextEntry2] : sleep apnea

## 2025-02-28 NOTE — ASSESSMENT
[FreeTextEntry1] : 44 yo F PMH CAD s/p PCI, HFimpEF, hx of VT arrest, preDM, GORAN, and LUIS presenting for evaluation of sleep apnea. She has a prior history of LUIS diagnosed in 2016 prior to bariatric surgery.  She was maintained on perioperative CPAP, though has not used therapy in many years.  She recently underwent cardiac stenting after VT arrest in December and is referred by her cardiologist for reevaluation.  She currently reports symptoms of snoring and frequent nocturnal awakenings without significant daytime somnolence (ESS today is 5). Her symptoms are suggestive of the possibility of having sleep apnea. She has been counseled on the health risks associated with LUIS including HTN, cardiovascular disease, arrhythmia, and stroke. Potential therapeutic options have been discussed. She will schedule a PSG and follow up after the study to discuss results and next steps.  Follow up after PSG

## 2025-02-28 NOTE — REASON FOR VISIT
[Initial Evaluation] : an initial evaluation [Home] : at home, [unfilled] , at the time of the visit. [Medical Office: ([facility])___] : at the medical office located in  [Telehealth (audio & video)] : This visit was provided via telehealth using real-time 2-way audio visual technology. [Verbal consent obtained from patient] : the patient, [unfilled] [FreeTextEntry2] : sleep apnea

## 2025-02-28 NOTE — HISTORY OF PRESENT ILLNESS
[Snoring] : snoring [Frequent Nocturnal Awakening] : frequent nocturnal awakening [FreeTextEntry1] : 44 yo F PMH CAD s/p PCI, HFimpEF, hx of VT arrest, preDM, GORAN, and LUIS presenting for evaluation of sleep apnea.  She is referred by her cardiologist as part of evaluation after cardiac arrest. She has a prior history of LUIS which was diagnosed prior to bariatric surgery. Testing from 2016 demonstrated AHI of 17.5/h with natalia saturation of 78%. She reports using CPAP during the braydon-operative period though has not used therapy in years. Of note, she was hospitalized for cardiac arrest 2/2 VT in December requiring stent. Does report hx of GORAN requiring iron infusions. Following with hematology - denies RLS symptoms. She was previously on zepbound though stopped after cardiac event. Does report snoring without witnessed apneas or daytime somnolence.   Goes to bed: 10pm Gets out of bed: 5am Nocturnal awakenings: 1-2 times  ____________________________________________________________________ EPWORTH SLEEPINESS SCALE   How likely are you to doze off or fall asleep in the situations described below, in contrast to feeling just tired? This refers to your usual way of life in recent times. Even if you haven't done some of these things recently, try to work out how they would have affected you. Use the following scale to choose one most appropriate number for each situation.   Chance of dozing.............Situation 2........................................Sitting and reading 1........................................Watching TV 1........................................Sitting inactive in a public place (eg a theatre or a meeting) 1........................................As a passenger in a car for an hour without a break 0........................................Lying down to rest in the afternoon when circumstances permit 0........................................Sitting and talking to someone 0........................................Sitting quietly after lunch without alcohol 0........................................In a car, while stopped for a few minutes in traffic   5........................................TOTAL SCORE   0 = Never would doze 1 = Slight chance of dozing 2 = Moderate chance of dozing 3 = High chance of dozing ______________________________________________________________________   [Witnessed Apneas] : no witnessed sleep apnea [Awakes Unrefreshed] : does not awake unrefreshed [Awakening With Dry Mouth] : no dry mouth upon awakening [Recent  Weight Gain] : no recent weight gain

## 2025-03-19 NOTE — HISTORY OF PRESENT ILLNESS
[FreeTextEntry1] : 44 yo F PMH CAD s/p PCI, HFimpEF, hx of VT arrest, preDM, GORAN, and LUIS presenting for follow up of sleep apnea.  She was initially seen by me 2/27/2025 after referral from her cardiologist as part of evaluation postcardiac arrest. She has a prior history of LUIS which was diagnosed prior to bariatric surgery. Testing from 2016 demonstrated AHI of 17.5/h with natalia saturation of 78%. She reports using CPAP during the braydon-operative period though has not used therapy in years. Of note, she was hospitalized for cardiac arrest 2/2 VT in December requiring stent. Does report hx of GORAN requiring iron infusions. Following with hematology - denies RLS symptoms. She was previously on zepbound though stopped after cardiac event. Does report snoring without witnessed apneas or daytime somnolence.  3/19/25 - She was referred for in center polysomnogram for further evaluation.  Testing from 3/4/2025 shows overall moderate LUIS with severe frequency during supine sleep (AHI 22.6/h, supine AHI 72.1/h).  Mild to moderate desaturations were noted with T90 of 2.6% and natalia saturation of 80%. She reports overall stable symptoms of snoring and frequent nocturnal awakenings with mild daytime somnolence.  She does not recall her initial experience in regards to therapeutic benefit with PAP; however, she does report some discomfort and intolerance overall mask interface. [Snoring] : snoring [Frequent Nocturnal Awakening] : frequent nocturnal awakening

## 2025-03-19 NOTE — ASSESSMENT
[FreeTextEntry1] : 42 yo F PMH CAD s/p PCI, HFimpEF, hx of VT arrest, preDM, GORAN, and LUIS presenting for follow up of sleep apnea. She has a chronic history of LUIS with prior diagnostic testing prior to bariatric surgery demonstrating moderate severity with AHI of 17.5/h and natalia saturation of 78%.  She underwent repeat evaluation after cardiac arrest with polysomnogram from 3/4/2025 showing overall moderate severity (AHI 22.6/h) though with significant positional dependency and severe frequency in the supine position (supine AHI 72.1/h).  Mild to moderate desaturations were noted with T90 of 2.6% and natalia saturation of 80%.  Risks of untreated LUIS and potential therapeutic options were discussed.  She notes prior intolerance to PAP therapy and we will pursue positional therapy for moderate LUIS.  She is instructed to follow-up with me in 3 to 4 weeks to assess response and timing for repeat home sleep testing.  In addition, she is considering reinitiation of GLP-1/GIP therapy though will be confirming with her cardiologist prior to restarting.  Follow-up in 3 to 4 weeks.

## 2025-04-01 NOTE — HISTORY OF PRESENT ILLNESS
[de-identified] : 44 yo F hx obesity, s/p gastric sleeve on GLP, prediabetes, LUIS, s/p NSTEMI with I AMERICA to OM1 Dec 2024 when she presented to ER with chest pain. Had cardiac arrest with VT/Torsade following day after admission, emergent LHC, I AMERICA placed to OM,  TTE 1/4/25-improved EF 55-60%, started cardaic rehab

## 2025-04-02 NOTE — ASSESSMENT
[FreeTextEntry1] : Khloe is a pleasant 43 year-old female who presents to discuss obesity medicine options today.   We reviewed indications, MOA, safety/contraindications, efficacy, storage, dosage, administration, missed doses, dose titration, availability of medication, side effects/adverse reactions and insurance coverage. Patient is interested in pursuing GLP-RA therapy at this time.   Patient had recent blood work done at a [facility] lab for my review.  In the interim, I will try to obtain authorization for Zepbound once weekly GLP-1 injection. I will expedite authorization request via Vivo and contact patient once authorization determination has been reached. In the interim patient will continue to increase his physical activity as tolerated and I recommend at least 150 minutes/week of both cardiovascular and resistance training methods.     Patient will contact me monthly to discuss medication tolerance and dose escalation and we will connect via  or in person office visit every 2 months for follow-up.   I will check blood work every 3 months while on GLP-1 medication. Patient agrees with current plan.   We also discussed focusing on high quality lean proteins decrease carbohydrates and decrease fats. Patient verbalized understanding. I will also refer patient to our practice RD, Iam Lai, for pablito/macro nutritional support.   Again, I have discussed initiating Zepbound therapy. All risks and benefits have been discussed with the patient. Risks include but are not limited to nausea, vomiting, constipation, diarrhea, and GI upset. Contraindications include Pancreatitis, MENS type 2 and medullary thyroid cancer, and pregnancy. Pt. verbalize understanding and wishes to proceed with medical therapy. Instructions for use provided via office demonstration.   We discussed monthly dose escalation until patient is pleased with results, developed a steady diet plan and exercise regimen - then will slowly titrate downward based on comfortability and consider long term maintenance dose.   All questions answered to patient's satisfaction. Continue recommendations per Cardiology.

## 2025-04-02 NOTE — ASSESSMENT
[FreeTextEntry1] : Khloe is a pleasant 43 year-old female who presents to discuss obesity medicine options today.   We reviewed indications, MOA, safety/contraindications, efficacy, storage, dosage, administration, missed doses, dose titration, availability of medication, side effects/adverse reactions and insurance coverage. Patient is interested in pursuing GLP-RA therapy at this time.   Patient had recent blood work done at a [facility] lab for my review.  In the interim, I will try to obtain authorization for Zepbound once weekly GLP-1 injection. I will expedite authorization request via Vivo and contact patient once authorization determination has been reached. In the interim patient will continue to increase his physical activity as tolerated and I recommend at least 150 minutes/week of both cardiovascular and resistance training methods.     Patient will contact me monthly to discuss medication tolerance and dose escalation and we will connect via  or in person office visit every 2 months for follow-up.   I will check blood work every 3 months while on GLP-1 medication. Patient agrees with current plan.   We also discussed focusing on high quality lean proteins decrease carbohydrates and decrease fats. Patient verbalized understanding. I will also refer patient to our practice RD, Iam Lai, for pablito/macro nutritional support.   Again, I have discussed initiating Zepbound therapy. All risks and benefits have been discussed with the patient. Risks include but are not limited to nausea, vomiting, constipation, diarrhea, and GI upset. Contraindications include Pancreatitis, MENS type 2 and medullary thyroid cancer, and pregnancy. Pt. verbalize understanding and wishes to proceed with medical therapy. Instructions for use provided via office demonstration.   We discussed monthly dose escalation until patient is pleased with results, developed a steady diet plan and exercise regimen - then will slowly titrate downward based on comfortability and consider long term maintenance dose.   All questions answered to patient's satisfaction. Continue recommendations per Cardiology.

## 2025-04-02 NOTE — HISTORY OF PRESENT ILLNESS
[FreeTextEntry1] : Khloe is a 43 year old female who presents for initial obesity medicine consultation.   Her obesity is refractory to diet and exercise efforts.   Patient is interested in exploring medical weight loss options. She is the Supervisor at [employer].  She is s/p VSG in 2016 with Dr. Wilde. Unfortunately, experienced weight regain due to maladaptive eating habits. She was originally on Wegovy and switched to Zepbound for greater weight loss potential. She had to d/c Zepbound 12/2024 2/2 NSTEMI c/b VT arrest s/p AMERICA x 1 to OM1. Her Cardiologist has cleared to her restart Zepbound.  Allergies: NKDA  PMHx: Obesity, GERD, GORAN, Vitamin D deficiency, NSTEMI c/b VT arrest s/p AMERICA x 1 to OM1 on 12/8/24 - does cardiac rehab 3x/week, s/p gastric sleeve in 2016, LUIS  Current weight (lb): 188 lbs Current BMI (kg/m2): 36.7

## 2025-04-02 NOTE — HISTORY OF PRESENT ILLNESS
[FreeTextEntry1] : Khloe is a 43 year old female who presents for initial obesity medicine consultation.   Her obesity is refractory to diet and exercise efforts.   Patient is interested in exploring medical weight loss options. She is the Supervisor at [employer].  She is s/p VSG in 2016 with Dr. Wilde. Unfortunately, experienced weight regain due to maladaptive eating habits. She was originally on Wegovy and switched to Zepbound for greater weight loss potential. She had to d/c Zepbound 12/2024 2/2 NSTEMI c/b VT arrest s/p AMERICA x 1 to OM1. Her Cardiologist has cleared to her restart Zepbound.  Allergies: NKDA  PMHx: Obesity, GERD, GORAN, Vitamin D deficiency, NSTEMI c/b VT arrest s/p AMERICA x 1 to OM1 on 12/8/24 - does cardiac rehab 3x/week, s/p gastric sleeve in 2016, LUIS  Current weight (lb): 188 lbs Current BMI (kg/m2): 36.7

## 2025-04-30 NOTE — PROCEDURE
[Locate IUD] : locate IUD [Transvaginal Ultrasound] : transvaginal ultrasound [Anteverted] : anteverted [FreeTextEntry3] : IUD in cavity

## 2025-05-23 NOTE — PHYSICAL EXAM
[Normal S1, S2] : normal S1, S2 [No Murmur] : no murmur [Clear Lung Fields] : clear lung fields [Soft] : abdomen soft [Non Tender] : non-tender [No Edema] : no edema [Normal DP B/L] : normal DP B/L [de-identified] : Anxious but no acute distress [de-identified] : Pale conjunctiva

## 2025-05-23 NOTE — HISTORY OF PRESENT ILLNESS
[FreeTextEntry1] : I reviewed the patient's previous records, cardiac cath reportAnd cardiac rehab notes  In brief the patient is 43 years old with a long history of heavy menses, Iron deficiency anemia.She has no history of diabetes or hypertension is a non-smoker  In January 2025 she presented with chest pressure arm pain shortness of breath with an abnormal EKG with T inversions laterally.  She underwent urgent cardiac cath.  She was initially found to have severe LV dysfunction which was out of proportion to the coronary disease as she had only distal circumflex disease which underwent successful stenting with complete resolution of her chest pain  Subsequent echo revealed normal LV and RV function no valvular disease  She has been in cardiac rehab doing extremely well without chest pain her heart rate has been lower no shortness of breath good exercise tolerance  Recently she has had an increase in her menses with significant amount of bleeding.Subsequent to this she has been experiencing recently increasing fatigue some shortness of breath which can occur both at rest and with exertion and vague chest discomfort.Her recent periods have been extremely heavy.  She comes today for concern that her symptoms could be cardiac in light of her previous stent though none of her symptoms is similar to what she had in January 2025  Recent blood work in January and an LDL of 55 triglycerides 65 HDL 48 iron 31Hemoglobin 13 hematocrit 42  EKG the 23rd 2025 normal sinus rhythm within normal limits T inversions that were presen tIn January 2025 have completely resolved

## 2025-05-23 NOTE — REASON FOR VISIT
[FreeTextEntry1] : Khloe Teixeira 43 years old patient of Dr. Bellamy Was seen urgently because of recent symptoms of fatigue shortness of breath vague chest discomfort.  She is status post PTCI of the distal circumflex in January 2025

## 2025-05-23 NOTE — DISCUSSION/SUMMARY
[FreeTextEntry1] : I reassured her that I thought her present symptoms were more related to her bleeding menses heavy and not cardiac related.  She should continue her present regimen of medication, follow-up with her gynecologist concerning these heavy menses and have a repeat hemoglobin and hematocrit  If symptoms continue, noninvasive cardiac evaluation with a stress echo can be considered at Patient will follow-up with Dr. Bellamy in 1 month [EKG obtained to assist in diagnosis and management of assessed problem(s)] : EKG obtained to assist in diagnosis and management of assessed problem(s)

## 2025-05-23 NOTE — ASSESSMENT
[FreeTextEntry1] : Khloe Monzon Is status post PTCI of the distal circumflex for unstable angina in January 2025, she has been doing extremely well since then in cardiac rehab but now had an increase in her menses with heavy bleeding and has developed some nonspecific fatigue vague shortness of breath and chest discomfort different than her presentation with her acute MI  She has a normal EKG, Normal physical exam I doubt her present symptoms are cardiac/ischemic I believe they more likely related to the recent heavy menses and perhaps some worsening of her anemia  1.  Heavy menses recently which have exacerbated 2.  Status post unstable angina non-ST elevation MI status post stent to the distal circumflex no other significant coronary disease 3.  Normal ECG 4.  Lipid panel excellent

## 2025-06-06 NOTE — CONSULT LETTER
[Dear  ___] : Dear  [unfilled], [Consult Letter:] : I had the pleasure of evaluating your patient, [unfilled]. [Please see my note below.] : Please see my note below. [Consult Closing:] : Thank you very much for allowing me to participate in the care of this patient.  If you have any questions, please do not hesitate to contact me. [Sincerely,] : Sincerely, [FreeTextEntry2] : [address], [address], [place] [address]  [FreeTextEntry3] : Austen Austin MD

## 2025-06-06 NOTE — HISTORY OF PRESENT ILLNESS
[FreeTextEntry1] : Ms. REYES is a 42 year female presenting for evaluation of CAD and HF.    Notable PMHx: - HFimpEF 2/2 ICM (Stage C, Class II) - CAD --- 12/2024: NSTEMI c/b VT arrest s/p AMERICA x 1 to OM1 - Obesity Class II --- s/p gastric sleeve in 2016 - Prediabetes  - LUIS - Iron Deficiency Anemia - no family history of MI or SCD - no prior pregnancies - never smoker   HPI: Since last visit, seen by Dr. Rueda for dyspnea, fatigue, chest discomfort. Notably she has had an increase in her menses with significant bleeding deemed the likely cause of her sxs. Recent admission for significant vaginal bleeding requiring 5 day admission and several blood transfunsions. Her brilinta and losartan were stopped. She is planned for hysterectomy.   2/5/25 seen by yancy and started on IV iron. Started virtual CR. Holter resulted showing sinus rhythm with 3 symptom triggers correlating with rare isolated PVCs and sinus rhythm, average HR of 64bpm. Since taking holter off she reports maybe one episode of the fluttering but otherwise has been feeling well. She has been working on her diet utilizing [employer] and healthy meals direct and has lost 6 lbs since discharge from the hospital in 12/2025.   1/8/25 labs resulted showing iron deficiency anemia, normal renal function, normal potassium and sodium, LDL of 25 and HDL of 37, and proBNP 317. Went to [facility] ED for back/chest pain that in hindsight was likely as a result of sleeping in recliner since  was sick. Workup included normal BNP, troponin, CRP. Negative CTPE study. EKG unchanged. Hgb 10. Her light-headedness has resolved since switching to nightime dosing of her HF meds. She has been monitoring her BP at home which has been in the 110s/60-70s. Her vaginal bleeding had stopped last month and then restarted. She saw gyn who is planning for pelvic US and IUD in 2 weeks. She has been walking the stores of [employer] and [employer] without limitations or symptoms.   12/20/24 Here for follow up after recent admission to [facility] for chest pain that was right sided and occuring at rest and later after meals. Described as pressure like. She had a runs of NSVT and then later PMVT arrest the day of admission requiring two rounds of CPR and 1 round of epi. She was taken emergently to the cath lab where she was found to have an acute OM plaque rupture and distal embolization which was stented with a good angiographic result. She was started on GDMT with losartan and toprol, statin, and DAPT with brilinta. She has been feeling okay since discharge struggling with when a pain is significant or not. She also has been having some light-headedness and heavy menses.    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 The entire apex, mid and apical anterior wall, mid and apical anterior septum, mid and apical inferior septum, mid and apical inferior wall, mid inferolateral segment, and mid anterolateral segment are akinetic. The basal anteroseptal segment, basal inferoseptal segment, basal inferolateral segment, basal anterolateral segment, basal anterior segment, and basal inferior segment are hypokinetic. Mil AI, mild to mod TR, PASP 48 Stress - 3/2016 EKG stress: normal. 10 mets, exercised 8.5 minutes.  Cardiac MRI - 12/11/24: LVEF 45%, wall motion abnormality of mid to apical and mid inferolateral/inferior walls and apical lateral and inferior wall with mild myocardial edema and subendocardial delayed enhancement. Normal RV. LGE < 50% thickness of wall.  Cath - 12/8/24: 1. Severe distal OM1 stenosis s/p successful balloon angioplasty and AMERICA x1. This is the culprit for the patient's presentation. IVUS shows atherosclerotic lesion. Patient had resolution of chest pain after PCI. 2. Severe LV dysfunction that is out of proportion to CAD. LVEDP was 31mmHg at the start of the case and patient was complaining of acute shortness of breath. She was placed on Bipap and given 20mg IV Lasix with a good urine output. Following PCI, a RHC was performed: RA 5, RV 35/7, PA 26/15/19, PCWP 16/17/13. Brennan CO 4.81, CI 2.59. Holter - 1/2025: ~7days, 3 triggers correlating with rare isolated PVCs and sr. PVC burden <1%. Other -

## 2025-06-06 NOTE — CONSULT LETTER
[Dear  ___] : Dear  [unfilled], [Consult Letter:] : I had the pleasure of evaluating your patient, [unfilled]. [Please see my note below.] : Please see my note below. [Consult Closing:] : Thank you very much for allowing me to participate in the care of this patient.  If you have any questions, please do not hesitate to contact me. [Sincerely,] : Sincerely, [FreeTextEntry2] : [address], [address], [place] [address]  [FreeTextEntry3] : Austen Austin MD

## 2025-06-06 NOTE — END OF VISIT
[Time Spent: ___ minutes] : I have spent [unfilled] minutes of time on the encounter which excludes teaching and separately reported services. [FreeTextEntry3] : I, Kendra Everett, acted as a scribe on behalf of Dr. Austen Austin M.D. on 06/03/2025.   All medical entries made by the scribe were at my Dr. Austen Austin M.D direction and personally dictated by me on 06/03/2025. I have reviewed the chart and agree that the record accurately reflects my personal performance of the history, physical exam, assessment and plan. I have also personally directed, reviewed, and agreed with the chart.

## 2025-06-06 NOTE — HISTORY OF PRESENT ILLNESS
[FreeTextEntry1] : 43 year old presents as a new patient for consultation regarding heavy menstrual bleeding in setting of being on blood thinners s/p PTCI of the distal circumflex on 2024. Pt was placed on brilinta since 2025 and is being followed by cardiologist Dr. Tha Bellamy. Pt was admitted to [facility] on 2025 and was discharged home on 2025 after receiving 5 units of PRBCs and 1 unit of platelets. Pt discharged on provera 10mg qd. Pt has stopped brilinta. Last H/H was 25.1 on 2025. Pt has hx of Mirena IUD which was expelled X2. Pt no longer wishes to conceive.   OBH:  GYNH: none PMH: CAD s/p 1 drug eluting stent placed 2024  PSH: Laparoscopic gastric sleeve (Dr. Christiana Wilde, 2016); PTCI of distal circumflex for angina (2024)  Medications: Omeprazole, Metoprolol, Zepbound, Atorvastatin, brilina 90mg qd, bASA X2  Allergies: NKDA   ACC: [id number] EXAM: MR PELVIS IC ORDERED BY: BRANDY MCCLAIN  PROCEDURE DATE: 2025    INTERPRETATION: CLINICAL INFORMATION: Heavy vaginal bleeding. Status post 5 units of packed red blood cells.  COMPARISON: 2025 pelvic ultrasound.  CONTRAST/COMPLICATIONS: IV Contrast: Gadavist 8.5 cc administered 1.5 cc discarded Oral Contrast: NONE .  PROCEDURE: MRI of the pelvis was performed.   FINDINGS: UTERUS: No fibroids. Focal hyperenhancement of the distal endocervix with T2 hyperintense signal (15:41), likely either an endocervical polyp or endocervical hyperplasia. ENDOMETRIUM: Normal thickness measuring 5 mm. JUNCTIONAL ZONE: Poorly delineated, although suspected to be thickened up to 1.4 cm (7:11).  RIGHT OVARY: 2.4 cm right ovarian cyst versus dominant follicle. Additional ovarian follicles noted. LEFT OVARY: Normal. ADNEXA: Within normal limits.  BLADDER: Within normal limits.  LYMPH NODES: No pelvic lymphadenopathy.  VISUALIZED PORTIONS:  ABDOMINAL ORGANS: Within normal limits. BOWEL: Within normal limits. PERITONEUM: Minimal free fluid. VESSELS: Within normal limits. ABDOMINAL WALL: Within normal limits. BONES: Within normal limits.  IMPRESSION: * Suspect uterine adenomyosis. No fibroids. * Normal thickness endometrium measuring 5 mm. * Focal hyperenhancement of the distal endocervix, likely either an endocervical polyp or endocervical hyperplasia.   --- End of Report ---  TIFFANIE SANTO DO; Attending Radiologist This document has been electronically signed. May 29 2025 8:13AM

## 2025-06-06 NOTE — PLAN
[FreeTextEntry1] : 43 year old female with symptomatic adenomyosis ie heavy menstrual bleeding. Pt s/p PTCI of the distal circumflex on 12/2024. stable  -Discussed the tx options for adenomyosis: including continued observation, hormonal management, endometrial ablation, and hysterectomy at length. The limitations of conservative options were outlined and the relative contraindication of ablation outlined. -continue to hold on brilinta as per cardiology -continue provera 10mg qd -discussed discontinuing zepbound 2 weeks prior to procedure  -rto for endometrial biopsy   -book TLH, BS   During this visit 30 minutes were spent face-to-face with greater than 50% of the time dedicated to counseling.

## 2025-06-06 NOTE — HISTORY OF PRESENT ILLNESS
[FreeTextEntry1] : 43 year old presents as a new patient for consultation regarding heavy menstrual bleeding in setting of being on blood thinners s/p PTCI of the distal circumflex on 2024. Pt was placed on brilinta since 2025 and is being followed by cardiologist Dr. Tha Bellamy. Pt was admitted to [facility] on 2025 and was discharged home on 2025 after receiving 5 units of PRBCs and 1 unit of platelets. Pt discharged on provera 10mg qd. Pt has stopped brilinta. Last H/H was 25.1 on 2025. Pt has hx of Mirena IUD which was expelled X2. Pt no longer wishes to conceive.   OBH:  GYNH: none PMH: CAD s/p 1 drug eluting stent placed 2024  PSH: Laparoscopic gastric sleeve (Dr. Christiana Wilde, 2016); PTCI of distal circumflex for angina (2024)  Medications: Omeprazole, Metoprolol, Zepbound, Atorvastatin, brilina 90mg qd, bASA X2  Allergies: NKDA   ACC: [id number] EXAM: MR PELVIS IC ORDERED BY: BRANDY MCCLAIN  PROCEDURE DATE: 2025    INTERPRETATION: CLINICAL INFORMATION: Heavy vaginal bleeding. Status post 5 units of packed red blood cells.  COMPARISON: 2025 pelvic ultrasound.  CONTRAST/COMPLICATIONS: IV Contrast: Gadavist 8.5 cc administered 1.5 cc discarded Oral Contrast: NONE .  PROCEDURE: MRI of the pelvis was performed.   FINDINGS: UTERUS: No fibroids. Focal hyperenhancement of the distal endocervix with T2 hyperintense signal (15:41), likely either an endocervical polyp or endocervical hyperplasia. ENDOMETRIUM: Normal thickness measuring 5 mm. JUNCTIONAL ZONE: Poorly delineated, although suspected to be thickened up to 1.4 cm (7:11).  RIGHT OVARY: 2.4 cm right ovarian cyst versus dominant follicle. Additional ovarian follicles noted. LEFT OVARY: Normal. ADNEXA: Within normal limits.  BLADDER: Within normal limits.  LYMPH NODES: No pelvic lymphadenopathy.  VISUALIZED PORTIONS:  ABDOMINAL ORGANS: Within normal limits. BOWEL: Within normal limits. PERITONEUM: Minimal free fluid. VESSELS: Within normal limits. ABDOMINAL WALL: Within normal limits. BONES: Within normal limits.  IMPRESSION: * Suspect uterine adenomyosis. No fibroids. * Normal thickness endometrium measuring 5 mm. * Focal hyperenhancement of the distal endocervix, likely either an endocervical polyp or endocervical hyperplasia.   --- End of Report ---  TIFFANIE SANTO DO; Attending Radiologist This document has been electronically signed. May 29 2025 8:13AM

## 2025-06-06 NOTE — DISCUSSION/SUMMARY
[FreeTextEntry1] : Here for follow up for CAD and HF. Her brilinta was recently stopped in the setting of significant vaginal bleeding requiring transfusions and is reasonable given she is about 6 months since her ACS event. She is at elevated but acceptable cardiac risk for hysterectomy without need for further cardiac testing. She should continue ASA perioperatively unless there is life threatening bleeding. Her jardiance should also be held at least 72 hours prior to surgery. I encouraged her to follow-up with heme as she likely would benefit from IV iron infusions.   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 C/C Dr. Austen Austin (gyn)

## 2025-06-12 NOTE — PLAN
[FreeTextEntry1] : 43 year  old female  symptomatic adenomyosis/ heavy menstrual bleeding. s/p endometrial biopsy  -Endometrial biopsy done without complication  -Pap/hpv done today -f/u path

## 2025-06-12 NOTE — PROCEDURE
[Endometrial Biopsy] : Endometrial biopsy [Time out performed] : Pre-procedure time out performed.  Patient's name, date of birth and procedure confirmed. [Consent Obtained] : Consent obtained [Risks] : risks [Benefits] : benefits [Alternatives] : alternatives [Patient] : patient [Infection] : infection [Bleeding] : bleeding [Allergic Reaction] : allergic reaction [Uterine Perforation] : uterine perforation [Pain] : pain [Negative] : negative pregnancy test [Betadine] : Betadine [None] : none [Tenaculum] : Tenaculum [Anteverted] : anteverted [Specimen Collected] : collected [Sent to Pathology] : placed in buffered formalin and sent for pathology [Tolerated Well] : Patient tolerated the procedure well [No Complications] : No complications [Easy Passage] : Easy passage [Irregular Bleeding] : irregular uterine bleeding [Moderate] : moderate [de-identified] : Adenomyosis

## 2025-06-12 NOTE — END OF VISIT
[FreeTextEntry3] :  I, Aishwarya Perez, acted as a scribe on behalf of Dr. Austen Austin M.D  on 11/04/2024.   All medical entries made by the scribe were at my, Dr. Austen Austin M.D ,  direction and personally dictated by me on 11/04/2024. I have reviewed the chart and agree that the record accurately reflects my personal performance of the history, physical exam, assessment and plan. I have also personally directed, reviewed, and agreed with the chart.

## 2025-06-12 NOTE — PROCEDURE
[Endometrial Biopsy] : Endometrial biopsy [Time out performed] : Pre-procedure time out performed.  Patient's name, date of birth and procedure confirmed. [Consent Obtained] : Consent obtained [Risks] : risks [Benefits] : benefits [Alternatives] : alternatives [Patient] : patient [Infection] : infection [Bleeding] : bleeding [Allergic Reaction] : allergic reaction [Uterine Perforation] : uterine perforation [Pain] : pain [Negative] : negative pregnancy test [Betadine] : Betadine [None] : none [Tenaculum] : Tenaculum [Anteverted] : anteverted [Specimen Collected] : collected [Sent to Pathology] : placed in buffered formalin and sent for pathology [Tolerated Well] : Patient tolerated the procedure well [No Complications] : No complications [Easy Passage] : Easy passage [Irregular Bleeding] : irregular uterine bleeding [Moderate] : moderate [de-identified] : Adenomyosis

## 2025-07-07 NOTE — END OF VISIT
[FreeTextEntry3] : I, Saba Aguirre, acted as a scribe on behalf of Dr. Jennifer Vo M.D. on 07/07/2025.   All medical entries made by the scribe were at my, Dr. Jennifer Vo M.D., direction and personally dictated by me on 07/07/2025. I have reviewed the chart and agree that the record accurately reflects my personal performance of the history, physical exam, assessment and plan. I have also personally directed, reviewed, and agreed with the chart.

## 2025-07-07 NOTE — ASSESSMENT
[Doing Well] : is doing well [No Sign of Infection] : is showing no signs of infection [de-identified] :  43 year old female presents for post-op visit s/p TLHBS, left ureterolysis, and cystoscopy. rx sent for umby cellulitis to see JS on Wed Jennifer Vo MD

## 2025-07-07 NOTE — HISTORY OF PRESENT ILLNESS
[Pain is well-controlled] : pain is well-controlled [Clean/Dry/Intact] : clean, dry and intact [None] : no vaginal bleeding [Normal] : normal [Pathology reviewed] : pathology reviewed [Erythema] : erythematous [Fever] : no fever [Chills] : no chills [Nausea] : no nausea [Vomiting] : no vomiting [de-identified] : 06/26/2025 [de-identified] :  Pelvic examination under anesthesia, total laparoscopic hysterectomy, left ureterolysis, bilateral salpingectomy and cystoscopy [de-identified] :  43 year old female presents for post-op visit s/p TLHBS, left ureterolysis, and cystoscopy. Reports doing well. Notes eating and bowel movements are okay. Denies vaginal bleeding. Denies fever. No longer taking pain medication. [de-identified] : umby incision cleaned, intact, redness/cellultiis seen

## 2025-07-07 NOTE — PLAN
[FreeTextEntry1] :  43 year old female presents for post-op visit s/p TLHBS, left ureterolysis, and cystoscopy.  -Continue hydrogen peroxide bid -Rx antibiotics -Okay to drive  F/u with Dr. Austin.

## 2025-07-08 NOTE — ASSESSMENT
[FreeTextEntry1] : 42 yo F PMH CAD s/p PCI, HFimpEF, hx of VT arrest, preDM, GORAN, and LUIS presenting for follow up of sleep apnea. She has chronic history of LUIS which was diagnosed prior to bariatric surgery.  Prior testing showed overall moderate LUIS with AHI of 17.5/h.  She used CPAP perioperatively though has not used in years.  She was evaluated by me after cardiac arrest and subsequent hospitalization with sleep study from March showing moderate to severe frequency with AHI of 22.6/h and RDI of 30.9/h.  Significant positional dependency was noted with supine AHI of 72.1/h.  She was initiated on a combination of positional therapy and weight loss with GLP-1/GIP agonist (prescribed by weight management).  She remains minimally symptomatic from a sleep standpoint though given her cardiac history we will proceed with repeat HST for evaluation of weight loss and positional therapy efficacy.  Potential need for further intervention if a significant degree of sleep apnea remains was discussed.  Follow-up after HST.

## 2025-07-08 NOTE — HISTORY OF PRESENT ILLNESS
[FreeTextEntry1] : 44 yo F PMH CAD s/p PCI, HFimpEF, hx of VT arrest, preDM, GORAN, and LUIS presenting for follow up of sleep apnea.  She was initially seen by me 2/27/2025 after referral from her cardiologist as part of evaluation postcardiac arrest. She has a prior history of LUIS which was diagnosed prior to bariatric surgery. Testing from 2016 demonstrated AHI of 17.5/h with natalia saturation of 78%. She reports using CPAP during the braydon-operative period though has not used therapy in years. Of note, she was hospitalized for cardiac arrest 2/2 VT in December requiring stent. Does report hx of GORAN requiring iron infusions. Following with hematology - denies RLS symptoms. She was previously on zepbound though stopped after cardiac event. Does report snoring without witnessed apneas or daytime somnolence.  3/19/25 - She was referred for in center polysomnogram for further evaluation. Testing from 3/4/2025 shows overall moderate LUIS with severe frequency during supine sleep (AHI 22.6/h, supine AHI 72.1/h). Mild to moderate desaturations were noted with T90 of 2.6% and natalia saturation of 80%. She reports overall stable symptoms of snoring and frequent nocturnal awakenings with mild daytime somnolence. She does not recall her initial experience in regards to therapeutic benefit with PAP; however, she does report some discomfort and intolerance overall mask interface.  7/8/25 - Since last visit decision was made to pursue a combination of weight loss via GLP-1/GIP agonist as well as positional therapy. She remains on zepbound and is currently on 7.5mg. Denies adverse GI effects. She reports losing approximately 10 pounds since initiation of therapy. States that she is doing well from a cardiac standpoint. Recently underwent hysterectomy for adenomyosis causing bleeding. Has been using wedge pillow. Continues to have minimal sleep related complaints.  [Daytime Somnolence] : no daytime somnolence

## 2025-07-13 NOTE — HISTORY OF PRESENT ILLNESS
[Pain is well-controlled] : pain is well-controlled [Clean/Dry/Intact] : clean, dry and intact [Pathology reviewed] : pathology reviewed [Fever] : no fever [Chills] : no chills [Nausea] : no nausea [Vomiting] : no vomiting [Erythema] : not erythematous [Swelling] : not swollen [Dehiscence] : not dehisced [Discharge] : absent of discharge [None] : no vaginal bleeding [Normal] : normal [de-identified] : 06/26/25 [de-identified] : laparoscopic hysterectomy, left ureterolysis, bilateral salpingectomy and cystoscopy.    [de-identified] : 42 y/o female presents s/p total laparoscopic hysterectomy, left ureterolysis, bilateral salpingectomy and cystoscopy. 06/26/25. Pt is doing well with no complaints. pt was discharged on day of surgery. Pt was seen in office on 7/7/25 and dx with umbilical cellulitus and rx augmentin and her sx have gone away.  Path: 06/26 Specimen(s) Submitted 1  Right fallopian tube 2  Left fallopian tube 3  Uterus and cervix  Final Diagnosis  1. Fallopian tube, right, excision  - Fallopian tubes with no significant histopathological change  2. Fallopian tube, left, excision - Fallopian tubes with no significant histopathological change  3. Uterus and cervix, total hysterectomy - Inactive endometrium - Focal adenomyosis - Endocervical polyps with reactive and metaplastic change - Ectocervix with reactive change  OPERATIVE FINDINGS:  On exam under anesthesia, 8 week size anteverted uterus, adnexa nonpalpable bilaterally.  On laparoscopy atraumatic entry site. Grossly normal uterus, tubes, and ovaries.  Ureter identified transperitoneally  on the right side and retroperitoneally with ureterolysis on the left side.  Survey of the abdomen revealed normal liver edge, gallbladder, stomach, intestines, and appendix.  On cystoscopy, normal bladder mucosa and bilateral ureteral jets visualized. [de-identified] : abd soft nt nd. umbilical incision does not appear infected SSE: cuff intact

## 2025-07-13 NOTE — PLAN
[FreeTextEntry1] : 44 y/o female s/p total laparoscopic hysterectomy, left ureterolysis, bilateral salpingectomy and cystoscopy. 06/26/25. Stable. Path benign.  - Routine post-operative care reviewed  - Reviewed pathology and operative findings with pt -pelvic rest x 10 weeks -pt to f/u with. Dr. Murdock for routine gyn care after next pov -rto 4 weeks

## 2025-07-13 NOTE — CONSULT LETTER
[Dear  ___] : Dear  [unfilled], [Please see my note below.] : Please see my note below. [Consult Closing:] : Thank you very much for allowing me to participate in the care of this patient.  If you have any questions, please do not hesitate to contact me. [Sincerely,] : Sincerely, [FreeTextEntry2] : Elly Murdock  [facility], [address] [place] [address]   [FreeTextEntry1] : Thank you for referring your patient  PING REYES to my office for consultation and treatment. Outlined below is her surgical treatment and followup.  [FreeTextEntry3] : Austen Austin MD

## 2025-07-13 NOTE — END OF VISIT
[FreeTextEntry3] : I, Rashida Berumen acted as a scribe on behalf of Dr. Austen Austin M.D. on 07/09/2025.   All medical entries made by the scribe were at my Dr. Austen Austin M.D direction and personally dictated by me on 07/09/2025. I have reviewed the chart and agree that the record accurately reflects my personal performance of the history, physical exam, assessment and plan. I have also personally directed, reviewed, and agreed with the chart.

## 2025-07-13 NOTE — HISTORY OF PRESENT ILLNESS
[Pain is well-controlled] : pain is well-controlled [Clean/Dry/Intact] : clean, dry and intact [Pathology reviewed] : pathology reviewed [Fever] : no fever [Chills] : no chills [Nausea] : no nausea [Vomiting] : no vomiting [Erythema] : not erythematous [Swelling] : not swollen [Dehiscence] : not dehisced [Discharge] : absent of discharge [None] : no vaginal bleeding [Normal] : normal [de-identified] : 06/26/25 [de-identified] : laparoscopic hysterectomy, left ureterolysis, bilateral salpingectomy and cystoscopy.    [de-identified] : 42 y/o female presents s/p total laparoscopic hysterectomy, left ureterolysis, bilateral salpingectomy and cystoscopy. 06/26/25. Pt is doing well with no complaints. pt was discharged on day of surgery. Pt was seen in office on 7/7/25 and dx with umbilical cellulitus and rx augmentin and her sx have gone away.  Path: 06/26 Specimen(s) Submitted 1  Right fallopian tube 2  Left fallopian tube 3  Uterus and cervix  Final Diagnosis  1. Fallopian tube, right, excision  - Fallopian tubes with no significant histopathological change  2. Fallopian tube, left, excision - Fallopian tubes with no significant histopathological change  3. Uterus and cervix, total hysterectomy - Inactive endometrium - Focal adenomyosis - Endocervical polyps with reactive and metaplastic change - Ectocervix with reactive change  OPERATIVE FINDINGS:  On exam under anesthesia, 8 week size anteverted uterus, adnexa nonpalpable bilaterally.  On laparoscopy atraumatic entry site. Grossly normal uterus, tubes, and ovaries.  Ureter identified transperitoneally  on the right side and retroperitoneally with ureterolysis on the left side.  Survey of the abdomen revealed normal liver edge, gallbladder, stomach, intestines, and appendix.  On cystoscopy, normal bladder mucosa and bilateral ureteral jets visualized. [de-identified] : abd soft nt nd. umbilical incision does not appear infected SSE: cuff intact

## 2025-07-13 NOTE — PLAN
[FreeTextEntry1] : 42 y/o female s/p total laparoscopic hysterectomy, left ureterolysis, bilateral salpingectomy and cystoscopy. 06/26/25. Stable. Path benign.  - Routine post-operative care reviewed  - Reviewed pathology and operative findings with pt -pelvic rest x 10 weeks -pt to f/u with. Dr. Murdock for routine gyn care after next pov -rto 4 weeks

## 2025-07-13 NOTE — ASSESSMENT
[Doing Well] : is doing well [No Sign of Infection] : is showing no signs of infection [de-identified] : 44 y/o female s/p laparoscopic hysterectomy, left ureterolysis, bilateral salpingectomy and cystoscopy. 06/26/25. Stable. Path benign. Postoperatively, the patient is doing well and is showing no signs of infection.

## 2025-07-13 NOTE — ASSESSMENT
[Doing Well] : is doing well [No Sign of Infection] : is showing no signs of infection [de-identified] : 44 y/o female s/p laparoscopic hysterectomy, left ureterolysis, bilateral salpingectomy and cystoscopy. 06/26/25. Stable. Path benign. Postoperatively, the patient is doing well and is showing no signs of infection.

## 2025-07-13 NOTE — CONSULT LETTER
[Dear  ___] : Dear  [unfilled], [Please see my note below.] : Please see my note below. [Consult Closing:] : Thank you very much for allowing me to participate in the care of this patient.  If you have any questions, please do not hesitate to contact me. [Sincerely,] : Sincerely, [FreeTextEntry2] : Elly Murdock  [facility], [address] [place] [address]   [FreeTextEntry1] : Thank you for referring your patient  PING REYES to my office for consultation and treatment. Outlined below is her surgical treatment and followup.  [FreeTextEntry3] : Austen Austin MD

## 2025-07-21 NOTE — DISCUSSION/SUMMARY
[EKG obtained to assist in diagnosis and management of assessed problem(s)] : EKG obtained to assist in diagnosis and management of assessed problem(s) [FreeTextEntry1] : Here for follow up for CAD and HF. Palpitations essentially resolved since splitting toprol to BID dosing. Discussed restarting losartan but jointly opted to hold off for now given recovered LVEF and can consider at f/u. 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

## 2025-07-21 NOTE — HISTORY OF PRESENT ILLNESS
[FreeTextEntry1] : Ms. REYES is a 42 year female presenting for evaluation of CAD and HF.    Notable PMHx: - HFimpEF 2/2 ICM (Stage C, Class II) - CAD --- 12/2024: NSTEMI c/b VT arrest s/p AMERICA x 1 to OM1 - Obesity Class II --- s/p gastric sleeve in 2016 - Prediabetes  - LUIS - Iron Deficiency Anemia - no family history of MI or SCD - no prior pregnancies - never smoker   HPI: Since last visit, restarted brilinta after discussing with gyn that there are no concerns with restarting post-op. Had an episode of palpitations on 7/18 and was instructed to take toprol dose early and switch 50mg qd to 25mg BID. Palpitations mostly resolved on new dosing schedule. No chest pain or dyspnea.  6/6/25 seen by Dr. Rueda for dyspnea, fatigue, chest discomfort. Notably she has had an increase in her menses with significant bleeding deemed the likely cause of her sxs. Recent admission for significant vaginal bleeding requiring 5 day admission and several blood transfusions. Her brilinta and losartan were stopped. She is planned for hysterectomy.   2/5/25 seen by yancy and started on IV iron. Started virtual CR. Holter resulted showing sinus rhythm with 3 symptom triggers correlating with rare isolated PVCs and sinus rhythm, average HR of 64bpm. Since taking holter off she reports maybe one episode of the fluttering but otherwise has been feeling well. She has been working on her diet utilizing [employer] and healthy meals direct and has lost 6 lbs since discharge from the hospital in 12/2025.   1/8/25 labs resulted showing iron deficiency anemia, normal renal function, normal potassium and sodium, LDL of 25 and HDL of 37, and proBNP 317. Went to [facility] ED for back/chest pain that in hindsight was likely as a result of sleeping in recliner since  was sick. Workup included normal BNP, troponin, CRP. Negative CTPE study. EKG unchanged. Hgb 10. Her light-headedness has resolved since switching to nightime dosing of her HF meds. She has been monitoring her BP at home which has been in the 110s/60-70s. Her vaginal bleeding had stopped last month and then restarted. She saw gyn who is planning for pelvic US and IUD in 2 weeks. She has been walking the stores of [employer] and [employer] without limitations or symptoms.   12/20/24 Here for follow up after recent admission to [facility] for chest pain that was right sided and occuring at rest and later after meals. Described as pressure like. She had a runs of NSVT and then later PMVT arrest the day of admission requiring two rounds of CPR and 1 round of epi. She was taken emergently to the cath lab where she was found to have an acute OM plaque rupture and distal embolization which was stented with a good angiographic result. She was started on GDMT with losartan and toprol, statin, and DAPT with brilinta. She has been feeling okay since discharge struggling with when a pain is significant or not. She also has been having some light-headedness and heavy menses.    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 Normal EKG  Data Review: Echo - 1/4/2025: LVEF 55%, normal diastology, normal RV, trace MR, normal PASP,  - 12/10/24: LVEF 28% - 12/8/2024 TTE: LVEF 20%. The entire apex, mid and apical anterior wall, mid and apical anterior septum, mid and apical inferior septum, mid and apical inferior wall, mid inferolateral segment, and mid anterolateral segment are akinetic. The basal anteroseptal segment, basal inferoseptal segment, basal inferolateral segment, basal anterolateral segment, basal anterior segment, and basal inferior segment are hypokinetic. Mil AI, mild to mod TR, PASP 48 Stress - 3/2016 EKG stress: normal. 10 mets, exercised 8.5 minutes.  Cardiac MRI - 12/11/24: LVEF 45%, wall motion abnormality of mid to apical and mid inferolateral/inferior walls and apical lateral and inferior wall with mild myocardial edema and subendocardial delayed enhancement. Normal RV. LGE < 50% thickness of wall.  Cath - 12/8/24: 1. Severe distal OM1 stenosis s/p successful balloon angioplasty and AMERICA x1. This is the culprit for the patient's presentation. IVUS shows atherosclerotic lesion. Patient had resolution of chest pain after PCI. 2. Severe LV dysfunction that is out of proportion to CAD. LVEDP was 31mmHg at the start of the case and patient was complaining of acute shortness of breath. She was placed on Bipap and given 20mg IV Lasix with a good urine output. Following PCI, a RHC was performed: RA 5, RV 35/7, PA 26/15/19, PCWP 16/17/13. Brennan CO 4.81, CI 2.59. Holter - 1/2025: ~7days, 3 triggers correlating with rare isolated PVCs and sr. PVC burden <1%. Other -   ===